# Patient Record
Sex: MALE | Race: BLACK OR AFRICAN AMERICAN | NOT HISPANIC OR LATINO | URBAN - METROPOLITAN AREA
[De-identification: names, ages, dates, MRNs, and addresses within clinical notes are randomized per-mention and may not be internally consistent; named-entity substitution may affect disease eponyms.]

---
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
## 2024-09-13 NOTE — ED PEDIATRIC NURSE NOTE - NSFALLRSKPASTHIST_ED_ALL_ED
Medical Examination form from [facility] for patient completed and signed by Dr. Maravilla.  Form was returned to [facility] [place].  Patient did sign a release form.   yes